# Patient Record
Sex: FEMALE | Race: WHITE | NOT HISPANIC OR LATINO | Employment: UNEMPLOYED | URBAN - NONMETROPOLITAN AREA
[De-identification: names, ages, dates, MRNs, and addresses within clinical notes are randomized per-mention and may not be internally consistent; named-entity substitution may affect disease eponyms.]

---
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
## 2025-07-09 ENCOUNTER — OFFICE VISIT (OUTPATIENT)
Dept: OBSTETRICS AND GYNECOLOGY | Facility: HOSPITAL | Age: 20
End: 2025-07-09
Payer: MEDICAID

## 2025-07-09 ENCOUNTER — TRANSCRIBE ORDERS (OUTPATIENT)
Dept: OBSTETRICS AND GYNECOLOGY | Facility: HOSPITAL | Age: 20
End: 2025-07-09
Payer: MEDICAID

## 2025-07-09 ENCOUNTER — HOSPITAL ENCOUNTER (OUTPATIENT)
Dept: WOMENS IMAGING | Facility: HOSPITAL | Age: 20
Discharge: HOME | End: 2025-07-09
Admitting: OBSTETRICS & GYNECOLOGY
Payer: MEDICAID

## 2025-07-09 VITALS
DIASTOLIC BLOOD PRESSURE: 63 MMHG | BODY MASS INDEX: 20.83 KG/M2 | WEIGHT: 122 LBS | SYSTOLIC BLOOD PRESSURE: 114 MMHG | HEIGHT: 64 IN

## 2025-07-09 DIAGNOSIS — O24.410 DIET CONTROLLED GESTATIONAL DIABETES MELLITUS (GDM) IN THIRD TRIMESTER: ICD-10-CM

## 2025-07-09 DIAGNOSIS — Z34.90 PREGNANCY, UNSPECIFIED GESTATIONAL AGE: ICD-10-CM

## 2025-07-09 DIAGNOSIS — O24.419 GESTATIONAL DIABETES MELLITUS (GDM) IN THIRD TRIMESTER, GESTATIONAL DIABETES METHOD OF CONTROL UNSPECIFIED: ICD-10-CM

## 2025-07-09 DIAGNOSIS — O36.5930 IUGR (INTRAUTERINE GROWTH RETARDATION) AFFECTING MOTHER, THIRD TRIMESTER, NOT APPLICABLE OR UNSPECIFIED FETUS: Primary | ICD-10-CM

## 2025-07-09 DIAGNOSIS — O36.5931 IUGR (INTRAUTERINE GROWTH RESTRICTION) AFFECTING CARE OF MOTHER, THIRD TRIMESTER, FETUS 1: ICD-10-CM

## 2025-07-09 DIAGNOSIS — O36.5931 IUGR (INTRAUTERINE GROWTH RESTRICTION) AFFECTING CARE OF MOTHER, THIRD TRIMESTER, FETUS 1: Primary | ICD-10-CM

## 2025-07-09 PROCEDURE — 76819 FETAL BIOPHYS PROFIL W/O NST: CPT

## 2025-07-09 PROCEDURE — 76820 UMBILICAL ARTERY ECHO: CPT

## 2025-07-09 PROCEDURE — 76811 OB US DETAILED SNGL FETUS: CPT

## 2025-07-09 RX ORDER — PRENATAL VIT/IRON FUM/FOLIC AC 27MG-0.8MG
1 TABLET ORAL DAILY
COMMUNITY
Start: 2025-03-26

## 2025-07-09 NOTE — LETTER
"2025     Osmani Mascorro MD  [address] [facility] [address]  [facility] [address]    Patient: Beti Otto   YOB: 2005   Date of Visit: 2025     Dear Osmani Mascorro MD:       Thank you for referring Beti Otto to me for evaluation. Below are the relevant portions of my assessment and plan of care.    If you have questions, please do not hesitate to call me. I look forward to following Beti along with you.         Sincerely,        Douglas A. Milligan, MD        CC: No Recipients    Milligan, Douglas A, MD  [phone]  Sign when Signing Visit  Documentation of the ultrasound findings, images, and interpretations will be available in the patient's Viewpoint report which is located in the imaging tab in chart review.    Maternal/Fetal Medicine Consult Note     Name: Beti Otto    : 2005     MRN: [id number]     Referring Provider: Osmani Mascorro MD    Chief Complaint  IUGR; elevated dopplers    Subjective     History of Present Illness:  Beti Otto is a 19 y.o.  31w2d who presents today for IUGR    COURTNEY: Estimated Date of Delivery: 25     ROS:   As noted in HPI.     Past Medical History:   Diagnosis Date   • Gestational diabetes mellitus (GDM)       History reviewed. No pertinent surgical history.   OB History          2    Para   0    Term   0       0    AB   1    Living   0         SAB   1    IAB   0    Ectopic   0    Molar   0    Multiple   0    Live Births   0                Objective     Vital Signs  /63   Ht 161.3 cm (63.5\")   Wt 55.3 kg (122 lb)   Estimated body mass index is 21.27 kg/m² as calculated from the following:    Height as of this encounter: 161.3 cm (63.5\").    Weight as of this encounter: 55.3 kg (122 lb).    Physical Exam    Ultrasound Impression:   See Viewpoint    Assessment and Plan     Beti Otto is a 19 y.o.  31w2d who presents today for IUGR    Diagnoses and all orders for this visit:    1. IUGR " (intrauterine growth retardation) affecting mother, third trimester, not applicable or unspecified fetus (Primary)  Assessment & Plan:  Referred for intrauterine growth restriction noted on ultrasound performed in primary OB office.  Patient had an abnormal 1 hour glucose tolerance test and is checking fingersticks regularly.  Her fastings are under 100 and her postprandials are almost all under 120.  Patient reports no complications this pregnancy and notes good fetal movement.  Patient does smoke.    Ultrasound today demonstrates a normally grown fetus with overall growth at the 25th percentile.  Abdominal circumference was at approximately the 20th percentile.  Amniotic fluid volume and umbilical artery Dopplers were normal.  There were no fetal abnormalities seen and BPP was 8 out of 8.    Fetus does not appear to have intrauterine growth restriction.  Patient did not have a 3-hour glucose tolerance test but had a markedly abnormal 1 hour test and is being treated as gestationally diabetic.  We would continue this.  We will rescan the patient again in 4 weeks time to assess fetal growth and wellbeing.  At the current time her gestational diabetes appears to be under good control and she will continue to report her sugars to Dr. Varner.  If desired we can assist in managing the patient's diabetes if needed.  If the patient remains diet-controlled we would recommend  testing beginning at 36 weeks gestation and would recommend delivery by 39 weeks gestation.    Patient was counseled extensively regarding fetal movement and will contact her provider immediately if she notices any decreased fetal movement.    Orders:  -      [facility]; Future    2. Pregnancy, unspecified gestational age  -      [facility]; Future    3. Diet controlled gestational diabetes mellitus (GDM) in third trimester  -      [facility]; Future         Follow  Up  Return in about 4 weeks (around 2025).    I spent 30 minutes caring for the patient on the day of service. This included: obtaining or reviewing a separately obtained medical history, reviewing patient records, performing a medically appropriate exam and/or evaluation, counseling or educating the patient/family/caregiver, ordering medications, labs, and/or procedures and documenting such in the medical record. This does not include time spent on review and interpretation of other tests such as fetal ultrasound or the performance of other procedures such as amniocentesis or CVS.      Douglas A. Milligan, MD  Maternal Fetal Medicine, [facility]     2025

## 2025-07-09 NOTE — PROGRESS NOTES
Patient denies any leaking of fluid, vaginal bleeding, or contractions.  NIPT negative.  Patient reports next follow-up appointment with Dr. Mascorro's office is 7/10.

## 2025-07-09 NOTE — PROGRESS NOTES
"Documentation of the ultrasound findings, images, and interpretations will be available in the patient's Viewpoint report which is located in the imaging tab in chart review.    Maternal/Fetal Medicine Consult Note     Name: Beti Otto    : 2005     MRN: [id number]     Referring Provider: Osmani Mascorro MD    Chief Complaint  IUGR; elevated dopplers    Subjective     History of Present Illness:  Beti Otto is a 19 y.o.  31w2d who presents today for IUGR    COURTNEY: Estimated Date of Delivery: 25     ROS:   As noted in HPI.     Past Medical History:   Diagnosis Date    Gestational diabetes mellitus (GDM)       History reviewed. No pertinent surgical history.   OB History          2    Para   0    Term   0       0    AB   1    Living   0         SAB   1    IAB   0    Ectopic   0    Molar   0    Multiple   0    Live Births   0                Objective     Vital Signs  /63   Ht 161.3 cm (63.5\")   Wt 55.3 kg (122 lb)   Estimated body mass index is 21.27 kg/m² as calculated from the following:    Height as of this encounter: 161.3 cm (63.5\").    Weight as of this encounter: 55.3 kg (122 lb).    Physical Exam    Ultrasound Impression:   See Viewpoint    Assessment and Plan     Beti Otto is a 19 y.o.  31w2d who presents today for IUGR    Diagnoses and all orders for this visit:    1. IUGR (intrauterine growth retardation) affecting mother, third trimester, not applicable or unspecified fetus (Primary)  Assessment & Plan:  Referred for intrauterine growth restriction noted on ultrasound performed in primary OB office.  Patient had an abnormal 1 hour glucose tolerance test and is checking fingersticks regularly.  Her fastings are under 100 and her postprandials are almost all under 120.  Patient reports no complications this pregnancy and notes good fetal movement.  Patient does smoke.    Ultrasound today demonstrates a normally grown fetus with overall growth at the " 25th percentile.  Abdominal circumference was at approximately the 20th percentile.  Amniotic fluid volume and umbilical artery Dopplers were normal.  There were no fetal abnormalities seen and BPP was 8 out of 8.    Fetus does not appear to have intrauterine growth restriction.  Patient did not have a 3-hour glucose tolerance test but had a markedly abnormal 1 hour test and is being treated as gestationally diabetic.  We would continue this.  We will rescan the patient again in 4 weeks time to assess fetal growth and wellbeing.  At the current time her gestational diabetes appears to be under good control and she will continue to report her sugars to Dr. Varner.  If desired we can assist in managing the patient's diabetes if needed.  If the patient remains diet-controlled we would recommend  testing beginning at 36 weeks gestation and would recommend delivery by 39 weeks gestation.    Patient was counseled extensively regarding fetal movement and will contact her provider immediately if she notices any decreased fetal movement.    Orders:  -     [facility]; Future    2. Pregnancy, unspecified gestational age  -     [facility]; Future    3. Diet controlled gestational diabetes mellitus (GDM) in third trimester  -     [facility]; Future         Follow Up  Return in about 4 weeks (around 2025).    I spent 30 minutes caring for the patient on the day of service. This included: obtaining or reviewing a separately obtained medical history, reviewing patient records, performing a medically appropriate exam and/or evaluation, counseling or educating the patient/family/caregiver, ordering medications, labs, and/or procedures and documenting such in the medical record. This does not include time spent on review and interpretation of other tests such as fetal ultrasound or the performance of other procedures such as amniocentesis or  CVS.      Douglas A. Milligan, MD  Maternal Fetal Medicine, [facility]     2025

## 2025-07-09 NOTE — ASSESSMENT & PLAN NOTE
Referred for intrauterine growth restriction noted on ultrasound performed in primary OB office.  Patient had an abnormal 1 hour glucose tolerance test and is checking fingersticks regularly.  Her fastings are under 100 and her postprandials are almost all under 120.  Patient reports no complications this pregnancy and notes good fetal movement.  Patient does smoke.    Ultrasound today demonstrates a normally grown fetus with overall growth at the 25th percentile.  Abdominal circumference was at approximately the 20th percentile.  Amniotic fluid volume and umbilical artery Dopplers were normal.  There were no fetal abnormalities seen and BPP was 8 out of 8.    Fetus does not appear to have intrauterine growth restriction.  Patient did not have a 3-hour glucose tolerance test but had a markedly abnormal 1 hour test and is being treated as gestationally diabetic.  We would continue this.  We will rescan the patient again in 4 weeks time to assess fetal growth and wellbeing.  At the current time her gestational diabetes appears to be under good control and she will continue to report her sugars to Dr. Varner.  If desired we can assist in managing the patient's diabetes if needed.  If the patient remains diet-controlled we would recommend  testing beginning at 36 weeks gestation and would recommend delivery by 39 weeks gestation.    Patient was counseled extensively regarding fetal movement and will contact her provider immediately if she notices any decreased fetal movement.

## 2025-08-06 ENCOUNTER — HOSPITAL ENCOUNTER (OUTPATIENT)
Dept: WOMENS IMAGING | Facility: HOSPITAL | Age: 20
Discharge: HOME | End: 2025-08-06
Admitting: OBSTETRICS & GYNECOLOGY
Payer: MEDICAID

## 2025-08-06 ENCOUNTER — OFFICE VISIT (OUTPATIENT)
Dept: OBSTETRICS AND GYNECOLOGY | Facility: HOSPITAL | Age: 20
End: 2025-08-06
Payer: MEDICAID

## 2025-08-06 VITALS — WEIGHT: 124 LBS | BODY MASS INDEX: 21.62 KG/M2 | DIASTOLIC BLOOD PRESSURE: 67 MMHG | SYSTOLIC BLOOD PRESSURE: 119 MMHG

## 2025-08-06 DIAGNOSIS — O36.5930 IUGR (INTRAUTERINE GROWTH RETARDATION) AFFECTING MOTHER, THIRD TRIMESTER, NOT APPLICABLE OR UNSPECIFIED FETUS: Primary | ICD-10-CM

## 2025-08-06 DIAGNOSIS — O24.410 DIET CONTROLLED GESTATIONAL DIABETES MELLITUS (GDM) IN THIRD TRIMESTER: ICD-10-CM

## 2025-08-06 DIAGNOSIS — Z3A.35 35 WEEKS GESTATION OF PREGNANCY: ICD-10-CM

## 2025-08-06 DIAGNOSIS — O36.5930 IUGR (INTRAUTERINE GROWTH RETARDATION) AFFECTING MOTHER, THIRD TRIMESTER, NOT APPLICABLE OR UNSPECIFIED FETUS: ICD-10-CM

## 2025-08-06 DIAGNOSIS — Z34.90 PREGNANCY, UNSPECIFIED GESTATIONAL AGE: ICD-10-CM

## 2025-08-06 PROCEDURE — 76819 FETAL BIOPHYS PROFIL W/O NST: CPT

## 2025-08-06 PROCEDURE — 76816 OB US FOLLOW-UP PER FETUS: CPT

## 2025-08-06 RX ORDER — FAMOTIDINE 40 MG/1
40 TABLET, FILM COATED ORAL DAILY
COMMUNITY
Start: 2025-07-29